# Patient Record
Sex: MALE | URBAN - METROPOLITAN AREA
[De-identification: names, ages, dates, MRNs, and addresses within clinical notes are randomized per-mention and may not be internally consistent; named-entity substitution may affect disease eponyms.]

---

## 2017-12-19 ENCOUNTER — IMPORTED ENCOUNTER (OUTPATIENT)
Dept: URBAN - METROPOLITAN AREA CLINIC 50 | Facility: CLINIC | Age: 57
End: 2017-12-19

## 2018-02-09 ENCOUNTER — IMPORTED ENCOUNTER (OUTPATIENT)
Dept: URBAN - METROPOLITAN AREA CLINIC 50 | Facility: CLINIC | Age: 58
End: 2018-02-09

## 2018-03-02 ENCOUNTER — IMPORTED ENCOUNTER (OUTPATIENT)
Dept: URBAN - METROPOLITAN AREA CLINIC 50 | Facility: CLINIC | Age: 58
End: 2018-03-02

## 2018-03-02 NOTE — PATIENT DISCUSSION
"""Continue Cool compresses both eyes as needed. apply a cool compress to eyes for 2-5 minutes"" ""Start Artificial tears both eyes BID-QID. Systane Balance or Refresh Advanced"" ""Continue Zaditor both eyes twice a day.  """

## 2018-03-30 ENCOUNTER — IMPORTED ENCOUNTER (OUTPATIENT)
Dept: URBAN - METROPOLITAN AREA CLINIC 50 | Facility: CLINIC | Age: 58
End: 2018-03-30

## 2018-03-30 NOTE — PATIENT DISCUSSION
"""Continue Artificial tears both eyes two - four times a day. "" ""Continue Zaditor both eyes twice a day. "" ""Continue Lotemax Gel both eyes twice a day. "" ""Continue Cool compresses both eyes as needed.  """

## 2018-08-17 ENCOUNTER — IMPORTED ENCOUNTER (OUTPATIENT)
Dept: URBAN - METROPOLITAN AREA CLINIC 50 | Facility: CLINIC | Age: 58
End: 2018-08-17

## 2018-08-17 NOTE — PATIENT DISCUSSION
"""Continue Artificial tears both eyes two - four times a day. sample of systane complete"" ""Start Pazeo both eyes twice a day. "" ""Continue Lotemax Gel both eyes twice a day. "" ""Continue Cool compresses both eyes two - four times a day. "" ""Start Durezol both eyes twice a day.  """

## 2018-09-07 ENCOUNTER — IMPORTED ENCOUNTER (OUTPATIENT)
Dept: URBAN - METROPOLITAN AREA CLINIC 50 | Facility: CLINIC | Age: 58
End: 2018-09-07

## 2018-09-14 ENCOUNTER — IMPORTED ENCOUNTER (OUTPATIENT)
Dept: URBAN - METROPOLITAN AREA CLINIC 50 | Facility: CLINIC | Age: 58
End: 2018-09-14

## 2018-09-14 NOTE — PATIENT DISCUSSION
"""Continue Artificial tears both eyes two - four times a day. sample of systane complete"" ""Start Pataday both eyes twice a day.  """

## 2019-04-05 ENCOUNTER — IMPORTED ENCOUNTER (OUTPATIENT)
Dept: URBAN - METROPOLITAN AREA CLINIC 50 | Facility: CLINIC | Age: 59
End: 2019-04-05

## 2019-04-05 NOTE — PATIENT DISCUSSION
"""Continue Pataday both eyes twice a day ."" ""Continue Cool compresses both eyes two - four times a day . """

## 2019-11-01 ENCOUNTER — IMPORTED ENCOUNTER (OUTPATIENT)
Dept: URBAN - METROPOLITAN AREA CLINIC 50 | Facility: CLINIC | Age: 59
End: 2019-11-01

## 2019-11-08 ENCOUNTER — IMPORTED ENCOUNTER (OUTPATIENT)
Dept: URBAN - METROPOLITAN AREA CLINIC 50 | Facility: CLINIC | Age: 59
End: 2019-11-08

## 2021-04-23 ASSESSMENT — TONOMETRY
OD_IOP_MMHG: 19
OS_IOP_MMHG: 10
OD_IOP_MMHG: 20
OS_IOP_MMHG: 22
OS_IOP_MMHG: 20
OS_IOP_MMHG: 12
OD_IOP_MMHG: 18
OD_IOP_MMHG: 20
OD_IOP_MMHG: 17
OS_IOP_MMHG: 16
OD_IOP_MMHG: 22
OD_IOP_MMHG: 20
OD_IOP_MMHG: 17
OS_IOP_MMHG: 14
OS_IOP_MMHG: 22
OS_IOP_MMHG: 16
OS_IOP_MMHG: 18
OD_IOP_MMHG: 15
OD_IOP_MMHG: 14
OD_IOP_MMHG: 11
OS_IOP_MMHG: 18
OD_IOP_MMHG: 17
OS_IOP_MMHG: 20
OS_IOP_MMHG: 16

## 2021-04-23 ASSESSMENT — VISUAL ACUITY
OD_CC: 20/25
OS_CC: 20/40
OD_CC: 20/40
OS_CC: J1+
OS_CC: J1+
OD_CC: 20/25
OD_CC: J1+
OS_CC: 20/25-2
OS_CC: J1+
OD_CC: 20/25-2
OS_CC: 20/25
OD_CC: J1+@ 15 IN
OD_CC: 20/25
OD_CC: J1+
OS_CC: 20/25
OD_CC: J1+
OS_CC: 20/25-2
OS_CC: J1+@ 15 IN
OD_CC: 20/25
OS_CC: 20/30+2
OD_CC: 20/25
OS_CC: 20/30

## 2021-04-23 ASSESSMENT — PACHYMETRY
OS_CT_UM: 621
OD_CT_UM: 608
OD_CT_UM: 608
OS_CT_UM: 621
OS_CT_UM: 621
OD_CT_UM: 608
OS_CT_UM: 621
OD_CT_UM: 608
OS_CT_UM: 621
OS_CT_UM: 621
OD_CT_UM: 608
OS_CT_UM: 621
OS_CT_UM: 621